# Patient Record
(demographics unavailable — no encounter records)

---

## 2024-10-09 NOTE — PHYSICAL EXAM
VA stable with prism. [TextEntry] : Physical Examination:  General: Not in acute distress Head: Normocephalic, no lesions Throat: Clear, no exudates or lesions Chest: Lungs clear, no rales, no rhonchi, no wheezes CVS: S1/S2, RR, no murmurs, no rubs Abdomen: Soft NT/ND, +BS Extremities: 2-3 + edema  Neurological: Alert, awake, no gross focal deficits

## 2024-10-09 NOTE — RESULTS/DATA
[TextEntry] : Labs: 10/3/24 133/5.6/ bun 51 cr 1.86 eGFR 27  07/03/24 131/4.6/98/22/31/1.03/gfr55/ca10.7 glucose 166  6/25/24 Renal Ultrasound The right kidney measures 10.0 cm x 4.4 cm x 3.5 cm. There is mild fullness of the right renal collecting system.  The left kidney measures 10.0 cm x 5.0 cm x 5.0 cm. There is a 1.1 x 0.8 x 1.2 cm midpole cyst. There is a 0.4 cm echogenic focus in the lower pole of the left kidney representing a nonobstructing stone.  6/5/24 137/4.6/103/20/29/0.98/gfr54/ca10.2

## 2024-10-09 NOTE — HISTORY OF PRESENT ILLNESS
I reviewed the H&P, I examined the patient, and there are no changes in the patient's condition.   [FreeTextEntry1] : 10/9/24 For follow up Has urinary incontinence and sometimes cannot make it to the bathroom Still has edema and she is reluctant to take higher dose Lasix  She takes only 20 mg every other day    History of Present Illness. 06/21/24 Chief Complaint: Initial evaluation:  Pt 81 year old female with pmh of HTN, OA, s/p right TKR, RA, Rinvoq  borderline DM, AFIB on Eliquis, Comes for intimal evaluation of SHRUTHI and hyperkalemia. She had right TKR on 12/16 2023 [facility] followed by rehab until January 2024 and she has been taking Celebrex 2x a day up until 3 weeks ago. She developed SHRUTHI and hyperkalemia while she was on [place] lactone, enalapril. her repeat labs shows improvement in her renal function and she also stopped taking Celebrex. she also complaining about urinary incontinence and difficult to control her urine with frequent urination.

## 2024-10-09 NOTE — PHYSICAL EXAM
[TextEntry] : Physical Examination:  General: Not in acute distress Head: Normocephalic, no lesions Throat: Clear, no exudates or lesions Chest: Lungs clear, no rales, no rhonchi, no wheezes CVS: S1/S2, RR, no murmurs, no rubs Abdomen: Soft NT/ND, +BS Extremities: 2-3 + edema  Neurological: Alert, awake, no gross focal deficits

## 2024-10-09 NOTE — HISTORY OF PRESENT ILLNESS
[FreeTextEntry1] : 10/9/24 For follow up Has urinary incontinence and sometimes cannot make it to the bathroom Still has edema and she is reluctant to take higher dose Lasix  She takes only 20 mg every other day    History of Present Illness. 06/21/24 Chief Complaint: Initial evaluation:  Pt 81 year old female with pmh of HTN, OA, s/p right TKR, RA, Rinvoq  borderline DM, AFIB on Eliquis, Comes for intimal evaluation of SHRUTHI and hyperkalemia. She had right TKR on 12/16 2023 [facility] followed by rehab until January 2024 and she has been taking Celebrex 2x a day up until 3 weeks ago. She developed SHRUTHI and hyperkalemia while she was on [place] lactone, enalapril. her repeat labs shows improvement in her renal function and she also stopped taking Celebrex. she also complaining about urinary incontinence and difficult to control her urine with frequent urination.

## 2024-10-09 NOTE — PLAN
[TextEntry] : Plan: She will hold spironolactone until our repeat lab work shows her hyperkalemia has resolved She will be given Lokelma 10 g 3 times a week and have repeat labs in 2 weeks If hyperkalemia resolves, then she can go back to spironolactone She was recommended to furosemide 20 mg daily Discussed with her cardiologist Thank you

## 2024-10-09 NOTE — ASSESSMENT
[FreeTextEntry1] : Assessment: CKD 4, renal function essentially unchanged She has significant lower extremity edema She has recurrent hyperkalemia partly due to CKD and use of spironolactone.  She was told to discontinue spironolactone however she is reluctant She was recommended to have furosemide 20 mg daily, however she is taking every other day or as needed

## 2024-10-09 NOTE — REVIEW OF SYSTEMS
[TextEntry] : Review of Systems: General: No weight loss, No recent fever, chills HEENT: no headache, no change in vision or hearing Pulmonary: No SOB, or cough  CVS: No chest pain, SOUSA, or change in exercise tolerance  Gastrointestinal: No Nausea/vomiting/constipation/diarrhea : Increases frequency and trouble controlling her urine  Skin: Denies no new rash, lesions, ulcer  Musco skeletal: Has chronic edema  Neurological: No headache, dizziness, vertigo

## 2024-10-11 NOTE — ASSESSMENT
[FreeTextEntry1] : ASSESSMENT: The patient comes in today with multiple chronic exacerbated complaints with a baseline history of rheumatoid arthritis.  She is having difficulty with ambulation using her walker.  With this in mind we have discussed options she does brace which is helpful.  We have discussed oral medications activity modification.  She does elect for injections.  She would like an aspiration of the mass we have discussed risk of recurrence as well We have had a thorough discussion regarding the patient's wrist and hand MRI with discussed the intra-articular as well as extra-articular pathologic findings including findings related to joint capsule, any swelling and or tendon pathology as well as ligamentous.  The patient verbalized complete understanding. We reviewed the images together.  Wrist not visualized clearly on this MRI   The patient was adequately and thoroughly informed of my assessment of their current condition(s).  - This may diminish bodily function for the extremity. We discussed prognosis, tx modalities including operative and nonoperative options for the above diagnostic assessment. As always, 2nd opinion is always provided as an option.  When accessible, I was able to review other physicians note(s) including reviewing other tests, imaging results as well as personally view these results for my own interpretation.   Injection:   The risks and benefits of a steroid injection were discussed in detail. The risks include but are not limited to: pain, infection, swelling, flare response, bleeding, subcutaneous fat atrophy, skin depigmentation and/or elevation of blood sugar. The risk of incomplete resolution of symptoms, recurrence and additional intervention was reviewed and considered by the patient. The patient agreed to proceed and under a sterile prep, I injected 1 unit 6mg into 1 cc of a combination of Celestone and Lidocaine into the right wrist joint. The patient tolerated the injection well.  Aspiration Consent [right ring finger base ganglion]  The risks and benefits of a [ganglion cyst aspiration] were discussed in detail.  The risks include but are not limited to: pain, infection, swelling, flare response, bleeding.  The risk of incomplete resolution of symptoms, recurrence and additional intervention was reviewed and considered by the patient.  The patient agreed to proceed and under a sterile prep, I aspirated s The patient tolerated the injection well.  The patient was adequately and thoroughly informed of my assessment of their current condition(s).  DISCUSSION: 1.  Injection and separate aspiration as above.  Continue bracing and activity modification. 2.  History of RA.  Follow-up 4 months 3.  MRI of right hand and partial wrist viewed and reviewed

## 2024-10-11 NOTE — HISTORY OF PRESENT ILLNESS
[FreeTextEntry1] : Patti is a very pleasant 81-year-old female presenting today with a chronic history of bilateral wrist and hand discomfort describes history of rheumatoid arthritis and difficulty with walking using her rolling walker.  Denies trauma.  Presents with right hand MRI

## 2024-10-11 NOTE — PHYSICAL EXAM
[de-identified] : Examination of the right wrist reveals a mild effusion tenderness upon compression.  Range of motion is grossly preserved.  No signs of infection. Examination of the left basal joint reveals discomfort and tenderness upon compression with positive grind test for pain. Examination of the right ring finger base reveals a radial sided mass likely ganglion. [de-identified] : [4] views of [bilateral hands and wrists] were obtained today in my office and were seen by me and discussed with the patient.  These [show findings consistent with bilateral basal joint OA and findings of IP joint OA] -right wrist atypical arthropathy

## 2024-10-16 NOTE — PHYSICAL EXAM
[de-identified] : Examination of the right wrist reveals a mild effusion tenderness upon compression.  Range of motion is grossly preserved.  No signs of infection. Examination of the left basal joint reveals discomfort and tenderness upon compression with positive grind test for pain.  [de-identified] : [4] views of [bilateral hands and wrists] were reviewed today in my office and were seen by me and discussed with the patient.  These [show findings consistent with bilateral basal joint OA and findings of IP joint OA] -right wrist atypical arthropathy

## 2024-10-16 NOTE — ASSESSMENT
[FreeTextEntry1] : ASSESSMENT: The patient comes in today with multiple chronic exacerbated complaints with a baseline history of rheumatoid arthritis. She is having difficulty with ambulation using her walker. We have had a thorough discussion regarding the patient's wrist and hand MRI with discussed the intra-articular as well as extra-articular pathologic findings including findings related to joint capsule, any swelling and or tendon pathology as well as ligamentous.  The patient verbalized complete understanding. We reviewed the images together.  At this point in time based on these findings more typical of arthritis we have discussed continued bracing activity modification and if need be injection.  She agrees with the above.   The patient was adequately and thoroughly informed of my assessment of their current condition(s).  - This may diminish bodily function for the extremity. We discussed prognosis, tx modalities including operative and nonoperative options for the above diagnostic assessment. As always, 2nd opinion is always provided as an option.  When accessible, I was able to review other physicians note(s) including reviewing other tests, imaging results as well as personally view these results for my own interpretation.  The patient was adequately and thoroughly informed of my assessment of their current condition(s).  DISCUSSION: 1.  Status post injection and separate aspiration as per prior note.  Continue bracing and activity modification. 2.  History of RA.  Follow-up 4 months 3.  MRI of right hand and wrist now fully visualized was thoroughly reviewed and reviewed with patient

## 2024-10-16 NOTE — HISTORY OF PRESENT ILLNESS
[FreeTextEntry1] : Patti is a very pleasant 81-year-old female presenting today with a chronic history of bilateral wrist and hand discomfort describes history of rheumatoid arthritis and difficulty with walking using her rolling walker.  Denies trauma.  Presents with right hand and wrist MRI

## 2024-10-23 NOTE — REASON FOR VISIT
[Follow-Up] : a follow-up visit [Home] : at home, [unfilled] , at the time of the visit. [Medical Office: ([facility])___] : at the medical office located in  [Verbal consent obtained from patient] : the patient, [unfilled]

## 2024-10-23 NOTE — REASON FOR VISIT
[Follow-Up] : a follow-up visit [Home] : at home, [unfilled] , at the time of the visit. [Medical Office: ([facility])___] : at the medical office located in  [Verbal consent obtained from patient] : the patient, [unfilled]

## 2024-10-25 NOTE — HISTORY OF PRESENT ILLNESS
[FreeTextEntry1] : 10/23/24 Telephonic follow-up visit for recent hyperkalemia, hyponatremia.  At that time her spironolactone was discontinued, and she was kept on furosemide.  Initial evaluation:  06/21/24 Pt 81-year-old female with pmh of HTN, OA, s/p right TKR, RA, Rinvoq, borderline DM, AFIB on Eliquis, Takotsubo cardiomyopathy, Comes for intimal evaluation of SHRUTHI and hyperkalemia. She had right TKR on 12/16 2023 [facility] followed by rehab until January 2024 and she has been taking Celebrex 2x a day up until 3 weeks ago. She developed SHRUTHI and hyperkalemia while she was on [place] lactone, enalapril. her repeat labs show improvement in her renal function, and she also stopped taking Celebrex. she also complaining about urinary incontinence and difficult to control her urine with frequent urination.     PSH: 2 ablations, loop recorder implant, cataracts.  Ex- smoker, Drinks socially.   PCP Dr Adria Valdez Masiolo- cardio   PMH venal insufficiency  DM HTN Rheumatoid arthritis  AFIB   PSH  right Knee replacement- Dec 2023  Loop recorder implant 2 ablasion cataracts   SH lives alone   2 children  worked as a

## 2024-10-25 NOTE — HISTORY OF PRESENT ILLNESS
[FreeTextEntry1] : 10/23/24 Telephonic follow-up visit for recent hyperkalemia, hyponatremia.  At that time her spironolactone was discontinued, and she was kept on furosemide.  Initial evaluation:  06/21/24 Pt 81-year-old female with pmh of HTN, OA, s/p right TKR, RA, Rinvoq, borderline DM, AFIB on Eliquis, Takotsubo cardiomyopathy, Comes for intimal evaluation of SHRUTHI and hyperkalemia. She had right TKR on 12/16 2023 [facility] followed by rehab until January 2024 and she has been taking Celebrex 2x a day up until 3 weeks ago. She developed SHRUTHI and hyperkalemia while she was on [place] lactone, enalapril. her repeat labs show improvement in her renal function, and she also stopped taking Celebrex. she also complaining about urinary incontinence and difficult to control her urine with frequent urination.     PSH: 2 ablations, loop recorder implant, cataracts.  Ex- smoker, Drinks socially.   PCP Dr Adria Valdez Masiolo- cardio   PMH venal insufficiency  DM HTN Rheumatoid arthritis  AFIB   PSH  right Knee replacement- Dec 2023  Loop recorder implant 2 ablasion cataracts   SH lives alone   2 children  worked as a

## 2024-10-25 NOTE — PLAN
[TextEntry] : Plan: She was advised to furosemide 20 mg 2 tablets daily May resume spironolactone 25 mg every other day until we have repeat labs in about a month if that is okay then she probably can take daily Will repeat BMP and telephonic visit in 1 month. She was advised to avoid high potassium diet and high salt diet. Time spent over 25 minutes   Thank you

## 2024-10-25 NOTE — RESULTS/DATA
[TextEntry] : Labs: October 21, 2024 Sodium 136, potassium 4.1, BUN 26, creatinine 0.98, EGFR 58  812/24 na 128, K 5.3 ca 10.3 cr 0.95  07/03/24 131/4.6/98/22/31/1.03/gfr55/ca10.7 glucose 166  6/25/24 Renal Ultrasound The right kidney measures 10.0 cm x 4.4 cm x 3.5 cm. There is mild fullness of the right renal collecting system. The left kidney measures 10.0 cm x 5.0 cm x 5.0 cm. There is a 1.1 x 0.8 x 1.2 cm midpole cyst. There is a 0.4 cm echogenic focus in the lower pole of the left kidney representing a Non obstructing stone.  6/5/24 137/4.6/103/20/29/0.98/gfr54/ca10.2

## 2024-10-25 NOTE — REVIEW OF SYSTEMS
[TextEntry] : Review of Systems: General: No weight loss, No recent fever, chills HEENT: no headache, no change in vision or hearing Pulmonary: No SOB, or cough  CVS: No chest pain, SOUSA, or change in exercise tolerance  Gastrointestinal: No Nausea/vomiting/constipation/diarrhea : No complaint of dysuria or urinary frequency, no excess foaming  Skin: Denies no new rash, lesions, ulcer  Musco skeletal: No edema  Neurological: No headache, dizziness, vertigo

## 2024-10-25 NOTE — ASSESSMENT
[FreeTextEntry1] : Assessment: 10/23/2024 Her hyperkalemia has resolved, hyponatremia has improved She continues to have lower extremity edema

## 2024-12-04 NOTE — CONSULT LETTER
[Dear  ___] : Dear  [unfilled], [Consult Letter:] : I had the pleasure of evaluating your patient, [unfilled]. [Please see my note below.] : Please see my note below. [Sincerely,] : Sincerely, [DrAby  ___] : Dr. SCHAEFFER [DrAby ___] : Dr. SCHAEFFER [FreeTextEntry3] : Am[facility]mer DO [facility]\par  Pulmonary Critical Care\par  Director Pulmonary Division\par  Medical Director Respiratory Therapy\par  [facility]\par  \par

## 2024-12-04 NOTE — DISCUSSION/SUMMARY
[FreeTextEntry1] : Stable lung nodules 11/24-11/23 no progressive abnormalities, on full AC for PAF Doing well, no acute pulmonary complaints  RA, on Rituxan, followed by Rheumatology Last spirometry with decreased flows from 2020, but remains  normal Lung exam and sp02 are normal, Allergy work up negative by hx, ANCA negative Aspiration precautions, per pt had sleep study in [facility], no Rx recommended by hx Saw nephrology Vaccinations reviewed 6  months or sooner if needed,

## 2024-12-04 NOTE — PHYSICAL EXAM
[No Acute Distress] : no acute distress [Normal Appearance] : normal appearance [Normal Rate/Rhythm] : normal rate/rhythm [Normal S1, S2] : normal s1, s2 [No Murmurs] : no murmurs [No Rubs] : no rubs [No Gallops] : no gallops [No Resp Distress] : no resp distress [Clear to Auscultation Bilaterally] : clear to auscultation bilaterally [Normal to Percussion] : normal to percussion [No Abnormalities] : no abnormalities [No Clubbing] : no clubbing [No Cyanosis] : no cyanosis [No Edema] : no edema [FROM] : FROM [Normal Color/ Pigmentation] : normal color/ pigmentation [No Focal Deficits] : no focal deficits [Oriented x3] : oriented x3 [Normal Affect] : normal affect [TextBox_99] : ambulates with walker

## 2024-12-04 NOTE — PROCEDURE
[FreeTextEntry1] : CT scan 11/24- stable lung nodules from 11/23 hyperdense material mid esophagus- saw GI  Spirometry is normal, decreased flows from 2020

## 2024-12-04 NOTE — QUALITY MEASURES
The ECG revealed a sinus rhythm. The ECG rate was 67 bpm.  [Patient unable to perform] : patient is unable to perform spirometry

## 2024-12-04 NOTE — HISTORY OF PRESENT ILLNESS
[Former] : former [< 20 pack-years] : < 20 pack-years [TextBox_4] : saw Dr Monteiro Rheumatology for  RA now off  Rinvoq, had Rituxan in September, repeat in March planned no fever, chill, chest pain no sputum no sig dyspnea no sinus complaints ambulates with walker, had TKR 12/23 remains on full AC, PAF, on Farxiga, off spironolactone Dr Blackwell saw nephrology  [YearQuit] : 1990

## 2024-12-06 NOTE — RESULTS/DATA
[TextEntry] : Labs: 12.5.24 141/4.7/101/27/38/1.08/gfr52/ca10.2  October 21, 2024 Sodium 136, potassium 4.1, BUN 26, creatinine 0.98, EGFR 58  812/24 na 128, K 5.3 ca 10.3 cr 0.95  07/03/24 131/4.6/98/22/31/1.03/gfr55/ca10.7 glucose 166  6/25/24 Renal Ultrasound The right kidney measures 10.0 cm x 4.4 cm x 3.5 cm. There is mild fullness of the right renal collecting system. The left kidney measures 10.0 cm x 5.0 cm x 5.0 cm. There is a 1.1 x 0.8 x 1.2 cm midpole cyst. There is a 0.4 cm echogenic focus in the lower pole of the left kidney representing a Non obstructing stone.  6/5/24 137/4.6/103/20/29/0.98/gfr54/ca10.2

## 2024-12-06 NOTE — REVIEW OF SYSTEMS
[TextEntry] : Review of Systems: General: No weight loss, No recent fever, chills HEENT: no headache, no change in vision or hearing Pulmonary: No SOB, or cough  CVS: No chest pain, SOUSA, or change in exercise tolerance  Gastrointestinal: No Nausea/vomiting/constipation/diarrhea : No complaint of dysuria or urinary frequency, no excess foaming  Skin: Denies no new rash, lesions, ulcer  Musco skeletal: Swelling of her legs Neurological: No headache, dizziness, vertigo

## 2024-12-06 NOTE — HISTORY OF PRESENT ILLNESS
[FreeTextEntry1] : 12/6/24 Telehealth Time spent: 25 minutes Reason for visit: Follow up for: CKD stage 3A recent Hyperkalemia Hyponatremia LE edema  No significant interval events   PMH CKD stage 3A venous insufficiency  DM HTN Rheumatoid arthritis  AFIB   PSH  right Knee replacement- Dec 2023  Loop recorder implant 2 ablasion cataracts   SH lives alone   2 children  worked as a        10/23/24 Telephonic follow-up visit for recent hyperkalemia, hyponatremia.  At that time her spironolactone was discontinued, and she was kept on furosemide.  Initial evaluation:  06/21/24 Pt 81-year-old female with pmh of HTN, OA, s/p right TKR, RA, Rinvoq, borderline DM, AFIB on Eliquis, Takotsubo cardiomyopathy, Comes for intimal evaluation of SHRUTHI and hyperkalemia. She had right TKR on 12/16 2023 [facility] followed by rehab until January 2024 and she has been taking Celebrex 2x a day up until 3 weeks ago. She developed SHRUTHI and hyperkalemia while she was on [place] lactone, enalapril. her repeat labs show improvement in her renal function, and she also stopped taking Celebrex. she also complaining about urinary incontinence and difficult to control her urine with frequent urination.     PSH: 2 ablations, loop recorder implant, cataracts.  Ex- smoker, Drinks socially.   PCP Dr Adria Parada- cardio

## 2024-12-06 NOTE — REASON FOR VISIT
[Home] : at home, [unfilled] , at the time of the visit. [Medical Office: ([facility])___] : at the medical office located in  [Verbal consent obtained from patient] : the patient, [unfilled] [Follow-Up] : a follow-up visit

## 2024-12-06 NOTE — ASSESSMENT
[FreeTextEntry1] : Assessments: CKD stage 3A: Renal function is essentially unchanged  Hyperkalemia- improved after stopping spironolactone and taking furosemide regularly Hyponatremia- resolved  LE peripheral edema- claims it has not changed

## 2024-12-06 NOTE — PLAN
[TextEntry] : Plan: CKD stage 3A: Enalapril 20 mg, farxiga 10 mg Hyperkalemia- discontinued spironolactone, furosemide 40 mg every other day, low potassium diet Hyponatremia: Avoid excess fluid intake and continue furosemide  HTN: Metoprolol 100 mg LE edema: Furosemide 40 mg every other day  Low salt diet  Follow up with recommended workups in 2 months - telehealth

## 2024-12-18 NOTE — DISCUSSION/SUMMARY
[Medication Risks Reviewed] : Medication risks reviewed [Surgical risks reviewed] : Surgical risks reviewed [de-identified] : This is a 81 year old F pt presents today for 1 year postop status post right total knee arthroplasty done on 12/19/2023. She is overall doing well. I reassured the pt that her implants are stable with no evidence of loosening or wear. Pt understands the importance of prophylaxis for invasive dental procedures. I sent a script for physical therapy.   Regarding the right hip: based on the x-ray, she has advanced right hip osteoarthritis. The nature of condition and treatment options were discussed in detail. Pt is a potential candidate for right FUNMI. I discussed conservative treatment such as cortisone injections, PT, anti-inflammatories, and low impact exercise. Currently, her symptom is minimal. She should continue to do low impact exercises. She may take Tylenol and NSAIDs as needed.  F/u in May after her trip.  The patient is a 81 year old individual with end stage arthritis of their right hip joint. The patient has exhausted a minimum of 3 months conservative treatment including prior injections, physical therapy, over the counter NSAIDS and pain medication where indicated. In addition, the patient's quality of life is diminished due to significant chronic pain. The patient is having difficulty with activities of daily living, including ambulating, descending stairs, and rising from a seated position. Based upon the patients continued symptoms and failure to respond to conservative treatment, I have recommended a right total hip replacement for this patient. A long discussion took place with the patient describing what a total joint replacement is and what the procedure would entail. A hip model, similar to the implant that will be used during the operation, was utilized to demonstrate and to discuss the various bearing surfaces of the implants. Implant fixation, press fit vs cemented, was also discussed with the patient. Bearing surfaces, including ceramic-on highly cross-linked polyethylene and metal on highly cross-linked polyethylene were discussed with the patient. Choices of implant manufacturers, mainly [employer] and [employer], were discussed and reviewed with preference to be made by patient and surgeon prior to operation. Final selection to be based on customary practice as well as preoperative templating with selection confirmed intraoperatively based on the patient's anatomy. The patient participated and agreed with the decision-making process. The hospitalization and post-operative care and rehabilitation were also discussed. The use of perioperative antibiotics and DVT prophylaxis were discussed. The risk, benefits and alternatives to surgical interventions were discussed at length with the patient. The patient was also advised of risks related to the medical comorbidities and elevated body mass index (BMI). A lengthy discussion took place to review the most common complications including but not limited to: deep vein thrombosis,pulmonary embolism, heart attack, stroke, infection, wound breakdown, numbness, damage to nerves, tendon, muscles, arteries or other blood vessels, death and other possible complications from anesthesia. The patient was told that we will take steps to minimize these risks by using sterile technique, antibiotics and DVT prophylaxis when appropriate and follow the patient postoperatively in the office setting to monitor progress. The possibility of recurrent pain, no improvement in pain and actual worsening of pain were also discussed with the patient.   The discharge plan of care focused on the patient going home following surgery. The patient was encouraged to make the necessary arrangements to have someone stay with them when they are discharged home. Following discharge, a home care nurse will visit the patient. The home care nurse will open your home care case and request home physical therapy services. Home physical therapy will commence following discharge provided it is appropriate and covered by the health insurance benefit plan.   The benefits of surgery were discussed with the patient including the potential for improving his/her current clinical condition through operative intervention. Alternatives to surgical intervention including continued conservative management were also discussed in detail. All questions were answered to the satisfaction of the patient. The treatment plan of care, as well as a model of a total hip equivalent to the one that will be used for their total joint replacement, was shared with the patient. The patient participated and agreed to the plan of care as well as the use of the recommended implants for their total hip replacement surgery.

## 2024-12-18 NOTE — END OF VISIT
[FreeTextEntry3] : I, Adelaida Gregory, acted solely as a scribe for Dr. Jose Chiu on this date 12/18/2024.  I, Jose Chiu MD, personally performed the services described in the documentation, reviewed the documentation recorded by the scribe in my presence and it accurately and completely records my words and actions.

## 2024-12-18 NOTE — HISTORY OF PRESENT ILLNESS
[0] : a current pain level of 0/10 [de-identified] : 12/18/24:  she is 1 year from right total knee arthroplasty. She denies any pain in the knee, but she feels some swelling and weakness of the right leg. She can climb 3 or 4 steps but she is unable to climb flight of stairs. She notes soreness in the medial aspect of her right knee. She wants to check her right hip as well, since she is going down to [place] for a while. She wants to make sure everything is okay .  She does not have a hip pain  9/16/2024: Patti is an 81-year-old female that presents today for follow-up evaluation of her right knee status post right total knee arthroplasty on 12/19/2023. She does have known advanced osteoarthritis of the right hip. With regard to her right knee, she denies any significant pain. No significant swelling. She does walk with the use of a walker due to poor balance. She denies any mechanical symptoms or instability of the right knee.  She does have a history of rheumatoid arthritis. She was recently started on a new infusion of Rituxan. She feels that her RIGHT hip pain is mildly improved after this infusion. She was previously indicated for right total hip arthroplasty but was forced to cancel the surgery secondary to severe anemia with a hemoglobin of 8.6.     She states the symptoms are improving. Pain levels include a current pain level of 5/10, a minimum pain level of 1/10 and a maximum pain level of 7/10.

## 2024-12-18 NOTE — PHYSICAL EXAM
[Walker] : ambulates with walker [Antalgic] : not antalgic [de-identified] : GENERAL APPEARANCE: Well nourished and hydrated, pleasant, alert, and oriented x 3. Appears their stated age. HEENT: Normocephalic, extraocular eye motion intact. Nasal septum midline. Oral cavity clear. External auditory canal clear. RESPIRATORY: Breath sounds clear and audible in all lobes. No wheezing, No accessory muscle use. CARDIOVASCULAR: No apparent abnormalities. No lower leg edema. No varicosities. Pedal pulses are palpable. NEUROLOGIC: Sensation is normal, no muscle weakness in the upper or lower extremities. DERMATOLOGIC: No apparent skin lesions, moist, warm, no rash. SPINE: Cervical spine appears normal and moves freely; thoracic spine appears normal and moves freely; lumbosacral spine appears normal and moves freely, normal, nontender. MUSCULOSKELETAL: Hands, wrists, and elbows are normal and move freely, shoulders are normal and move freely.  Right knee incision is healed she has range of motion of 3 to 115 degree without pain in the knee. No erythema in the knee   Musculoskeletal: antalgic and ambulates with walker . Patient is ambulating with a walker. [de-identified] : Right hip examination shows no pain with gentle logroll of the hip joint [de-identified] : 3V xray of the right knee done in office today and reviewed by Dr. Jose Chiu demonstrates s/p right knee implants in good positioning with no evidence of wear, loosening, or subsidence.  1V xray of the pelvis done in the office today and reviewed by Dr. Jose Chiu demonstrates advanced right hip osteoarthritis.

## 2025-01-03 NOTE — HISTORY OF PRESENT ILLNESS
[FreeTextEntry1] : Patti is a very pleasant 81-year-old female presenting today with a chronic history of bilateral wrist and hand discomfort. She describes history of rheumatoid arthritis and difficulty with walking using her rolling walker. Denies trauma. Presents with right hand and wrist MRI during prior visit. Pain is well controlled today.

## 2025-01-03 NOTE — PHYSICAL EXAM
[de-identified] : Examination of the right wrist with range of motion grossly preserved. There are no signs of infection. Mild effusion with tremendous improvement. Examination of the left basal joint reveals minimal discomfort and tenderness upon compression with positive grind test for pain. [de-identified] : [4] views of [bilateral hands and wrists] were reviewed today in my office and were seen by me and discussed with the patient. These [show findings consistent with bilateral basal joint OA and findings of IP joint OA] -right wrist atypical arthropathy.

## 2025-01-03 NOTE — PHYSICAL EXAM
[de-identified] : Examination of the right wrist with range of motion grossly preserved. There are no signs of infection. Mild effusion with tremendous improvement. Examination of the left basal joint reveals minimal discomfort and tenderness upon compression with positive grind test for pain. [de-identified] : [4] views of [bilateral hands and wrists] were reviewed today in my office and were seen by me and discussed with the patient. These [show findings consistent with bilateral basal joint OA and findings of IP joint OA] -right wrist atypical arthropathy.

## 2025-01-03 NOTE — ASSESSMENT
[FreeTextEntry1] : ASSESSMENT: The patient comes in today with a chronic history of bilateral wrist and hand discomfort. She describes history of rheumatoid arthritis and difficulty with walking using her rolling walker. Denies trauma. Presents with right hand and wrist MRI during prior visit which was thoroughly viewed and reviewed with the patient. Pain is well controlled today which we are both delighted with. At this stage she we will continue with activity modifications as well as bracing as needed.  She has done well with injections last visit.   The patient was adequately and thoroughly informed of my assessment of their current condition(s).  - This may diminish bodily function for the extremity.  We discussed prognosis, treatment modalities including operative and nonoperative options for the above diagnostic assessment. As always, 2nd opinion is always provided as an option. For this, when accessible, I was able to review other physicians note(s) including reviewing other tests, imaging results as well as personally view these results for my own interpretation.      The patient was adequately and thoroughly informed of my assessment of their current condition(s).   DISCUSSION: 1.  Continue activity modifications.  Bracing.  History of RA. 2.  MRI of right hand and wrist viewed and reviewed thoroughly with patient during prior visit. 3. [x]

## 2025-01-04 NOTE — PHYSICAL EXAM
[Alert] : alert [No Acute Distress] : no acute distress [EOMI] : extra ocular movement intact [No LAD] : no lymphadenopathy [No Thyroid Nodules] : no palpable thyroid nodules [Normal Rate and Effort] : normal respiratory rate and effort [Clear to Auscultation] : lungs were clear to auscultation bilaterally [Normal S1, S2] : normal S1 and S2 [Normal Rate] : heart rate was normal [Not Tender] : non-tender [Soft] : abdomen soft [Normal Reflexes] : deep tendon reflexes were 2+ and symmetric [No Tremors] : no tremors [Oriented x3] : oriented to person, place, and time [Normal Affect] : the affect was normal [Normal Insight/Judgement] : insight and judgment were intact [Normal Mood] : the mood was normal [de-identified] : walks w walker

## 2025-01-04 NOTE — REVIEW OF SYSTEMS
[Recent Weight Gain (___ Lbs)] : recent weight gain: [unfilled] lbs [Polyuria] : polyuria [Nocturia] : nocturia [Joint Stiffness] : joint stiffness [Dysphagia] : no dysphagia [Neck Pain] : no neck pain [Dysphonia] : no dysphonia [Chest Pain] : no chest pain [Shortness Of Breath] : no shortness of breath [Nausea] : no nausea [Constipation] : no constipation [Abdominal Pain] : no abdominal pain [Depression] : no depression [Anxiety] : no anxiety [Polydipsia] : no polydipsia [FreeTextEntry8] : on diuretic [FreeTextEntry9] : s/p knee replacement

## 2025-01-04 NOTE — ASSESSMENT
[FreeTextEntry1] : 81 year old female with:  1. Nontoxic multinodular goiter  benign FNA Right and Left nodules in past, nodules stable on sonogram 2024  - repeat thyroid sono summer 2025 to monitor size and appearance of thyroid nodules  2. normal TSH levels but fluctuation T3/T4 levels while on Amiodarone. 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 Thyroid levels are normal and stable - no thyroid meds at this time  3. Mild primary hyperparathyroidism w mildly elevated calcium in past and mildly elevated 24 hour urine calcium and Osteopenia at all sites on bone density (including forearm). Recent calcium levels normal. Vit D ok - Discussed that she has mild hyperparathyroidism and she does not meet criteria for parathyroid surgery at this time. Will hold off on localization study. - Calcium normal and vit D normal. Cont dietary calcium intake and cont OTC Vitamin D3 1000 units daily to avoid further PTH elevation, increased PO water and will cont to monitor labs. Was notified by Dr Sanchez office that calcium level too deniz and advised to stop OTC calcium supplement - will need to disuss with Dr Sanchez - cont Alendronate 70 mg weekly on Sunday, adherent with dosing and admin - repeat DXA summer 2025  4. IFG, A1c lower at 5.9 **down from 6.6%** - cont dietary changes, now on Farxiga by cardiology for cardiac issues  40 min spent w - reviewed plan multiple times, all questions answered to pt satisfaction

## 2025-01-04 NOTE — DATA REVIEWED
[FreeTextEntry1] : Thyroid FNA 5/3/12 Left LP nodule 1.2 cm - colloid nodule with cystic degeneration, no malignant cells  Thyroid sono in office 9/13/18 stable bilateral thyrod nodules, Left nodule previously biopsied and reported as benign  Thyroid sono 8/15/19 multiple thyroid nodules, Right nodule 1.9 cm  Thyroid uptake and scan 9/5/19 showed thyroiditis from Amiodarone.   Thyroid Right nodule FNA -nondiagnostic 9.2019 and repeat FNA 11/2019 benign, cat 2  Thyroid sonogram 9/3/20 several subcm thyroid nodules RLP nodule 8x7x10 mm - hypoechoic, decreased RLP nodule 5x4x4 mm - hypoechoic, decreased RLP nodule  5x4x3 mm - cystic, new LUP nodule 6x3x3 mm - hypoechoic, stable LMP nodule 8x4x4 mm - hypoechoic, stable LLP nodule [id number] mm - hypoechoic, stable LLP nodule 6x5x4 mm - hypoechoic, stable  Thyroid sono 6/2/21 RLP nodule 8x8x8 mm - hypoechoic, heterogeneos, solid, stable LUP nodule 5x3x5 mm - hypoechoic, solid, stable LMP nodule 6x3x6 mm - hypoechoic, stable/smaller LLP nodule 12x7x7 mm - hypoechoic, stable  Thyroid sono 7/5/22 RLP nodule [id number] mm - slight increase RMP nodule 4x3x3 mm  RLP nodule 5x6x3 mm RMP noudle 4x3x3 mm LLP nodule [id number] mm - stable LMP nodule 6x6x4 mm LUP nodule 6x4x4 mm LLP nodule 6x7x6 mm   Thyroid sonogram 7/14/23 RLP nodule [id number] mm - hypoechoic, stable, TR4 RMP nodule 5x3x4 mm - hypoechoic, stable, TR4 RLP nodule 5x3x4 mm - hypoechoic, stable, TR4 LLP nodule [id number] mm - hypoechoic, stable, TR4 LMP nodule 7x4x6 mm - hypoechoic, stable, TR4 LUP nodule 6x4x5 mm - hypoechoic, stable, TR4 LLP nodule 6x5x8 mm - hypoechoic, stable, TR4 no parathyroid lesions  Thyroid sono 6/2024 multiple nodules, that are stable, new RLP nodule 6 mm ============================================================== DXA 6/10/21 Osteopenia FRAX MOF 14.3%, hip fx 3.8% Tscores: L1-L4 -1.1, osteopenia Left FN -1.9, osteopenia Right FN -1.8, osteopenia Left total hip -1.1 osteopenia Right total hip -1.2 osteopenia Radius 33% -1.5 osteopenia  DXA 6/2023 worsening osteopenia L1-L4 -1.6 (-6% change) Left FN -2.2 (-4.9% change) Right FN -2.2 (-5.9% change) Left hip -1.5 (-5.1% change) Right hip -1.6 (-4.7% change) forearm -1.4 (+ 1.8% change)  =======================  Labs 11/30/22 Gluc 105, A1c 5.7 Ca 10.1, alb 3.9, Mg 2.2, Phos 3.8 GFR >60 vit D 29, iPTH 131 TSH 1.124

## 2025-01-04 NOTE — HISTORY OF PRESENT ILLNESS
[FreeTextEntry1] : Interval Hx  - now on Farxiga by cardiologist - was told to stop calcium supplement by heme/onc due to high calcium level  In [place], found to have elevated iPTH levels. Labs 4/2021 elevated iPTH levels, normal calcium.   OTC meds: stopped taking OTC citracal + D 1x daily per Gyn. taking Vit D 1000 IU daily.   Diet: minimal dairy intake about 2-4 ounces milk daily, eating more yogurt has osteopenia on DXA in 2019 and in 2021 - started on Alendronate 35 mg weekly in 2021 by Gyn and now on 70 mg weekly since 2023 =============================================================================================== Thyroid nodules Dx in 2012 when she felt lump in neck. FNA Left nodule 2012 was benign. 11/2019 Right thyroid nodule FNA benign NOtes: normal TSH levels but fluctuation T3/T4 levels while on Amiodarone. Has been off Amiodarone since July 2019, fluctuating TSH levels after Amiodarone discontinued. most recent TFTs normal current meds: no thyroid meds

## 2025-05-19 NOTE — DISCUSSION/SUMMARY
[Medication Risks Reviewed] : Medication risks reviewed [Surgical risks reviewed] : Surgical risks reviewed [de-identified] : This is a 82 year old F pt presents today for right hip and knee pain. She is status post right total knee arthroplasty on 12/19/2023. I reassured the pt that her implants are stable with no evidence of loosening or wear. She continues to walk with a walker. She should continue to do low impact exercises.  Right hip: Patient does have bone-on-bone right hip osteoarthritis based on today's x-ray. She is also symptomatic for right trochanteric bursitis. The nature of condition and treatment options were discussed in detail. Patient is a candidate for right FUNMI. I discussed conservative treatment such as cortisone injections, PT, anti-inflammatories, and low impact exercise. She has been doing physical therapy in [place]. Patient opted for a right hip bursa cortisone injection which she tolerated well. If the injection does not help, I will send her a script for US-guided IA cortisone injection. She should continue to do low impact exercises. F/u in 3 months for re-evaluation.  The patient is a 82 year old individual with end stage arthritis of their right hip joint. The patient has exhausted a minimum of 3 months conservative treatment including prior injections, physical therapy, over the counter NSAIDS and pain medication where indicated. In addition, the patient's quality of life is diminished due to significant chronic pain. The patient is having difficulty with activities of daily living, including ambulating, descending stairs, and rising from a seated position. Based upon the patients continued symptoms and failure to respond to conservative treatment, I have recommended a right total hip replacement for this patient. A long discussion took place with the patient describing what a total joint replacement is and what the procedure would entail. A hip model, similar to the implant that will be used during the operation, was utilized to demonstrate and to discuss the various bearing surfaces of the implants. Implant fixation, press fit vs cemented, was also discussed with the patient. Bearing surfaces, including ceramic-on highly cross-linked polyethylene and metal on highly cross-linked polyethylene were discussed with the patient. Choices of implant manufacturers, mainly [employer] and [employer], were discussed and reviewed with preference to be made by patient and surgeon prior to operation. Final selection to be based on customary practice as well as preoperative templating with selection confirmed intraoperatively based on the patient's anatomy. The patient participated and agreed with the decision-making process. The hospitalization and post-operative care and rehabilitation were also discussed. The use of perioperative antibiotics and DVT prophylaxis were discussed. The risk, benefits and alternatives to surgical interventions were discussed at length with the patient. The patient was also advised of risks related to the medical comorbidities and elevated body mass index (BMI). A lengthy discussion took place to review the most common complications including but not limited to: deep vein thrombosis,pulmonary embolism, heart attack, stroke, infection, wound breakdown, numbness, damage to nerves, tendon, muscles, arteries or other blood vessels, death and other possible complications from anesthesia. The patient was told that we will take steps to minimize these risks by using sterile technique, antibiotics and DVT prophylaxis when appropriate and follow the patient postoperatively in the office setting to monitor progress. The possibility of recurrent pain, no improvement in pain and actual worsening of pain were also discussed with the patient.   The discharge plan of care focused on the patient going home following surgery. The patient was encouraged to make the necessary arrangements to have someone stay with them when they are discharged home. Following discharge, a home care therapist will visit the patient. The home care therapist will open your home care case and request home physical therapy services. Home physical therapy will commence following discharge provided it is appropriate and covered by the health insurance benefit plan.   The benefits of surgery were discussed with the patient including the potential for improving his/her current clinical condition through operative intervention. Alternatives to surgical intervention including continued conservative management were also discussed in detail. All questions were answered to the satisfaction of the patient. The treatment plan of care, as well as a model of a total hip equivalent to the one that will be used for their total joint replacement, was shared with the patient. The patient participated and agreed to the plan of care as well as the use of the recommended implants for their total hip replacement surgery.

## 2025-05-19 NOTE — PHYSICAL EXAM
[de-identified] : GENERAL APPEARANCE: Well nourished and hydrated, pleasant, alert, and oriented x 3. Appears their stated age. HEENT: Normocephalic, extraocular eye motion intact. Nasal septum midline. Oral cavity clear. External auditory canal clear. RESPIRATORY: Breath sounds clear and audible in all lobes. No wheezing, No accessory muscle use. CARDIOVASCULAR: No apparent abnormalities. No lower leg edema. No varicosities. Pedal pulses are palpable. NEUROLOGIC: Sensation is normal, no muscle weakness in the upper or lower extremities. DERMATOLOGIC: No apparent skin lesions, moist, warm, no rash. SPINE: Cervical spine appears normal and moves freely; thoracic spine appears normal and moves freely; lumbosacral spine appears normal and moves freely, normal, nontender. MUSCULOSKELETAL: Hands, wrists, and elbows are normal and move freely, shoulders are normal and move freely.  Right knee incision is healed she has range of motion of 5 to 115 degree without pain in the knee. No erythema in the knee She has adequate right hip range of motion she has a tenderness and pain over trochanteric bursa Musculoskeletal: antalgic and ambulates with walker. Patient is ambulating with a walker.   Musculoskeletal: not antalgic and ambulates with walker . Right hip examination shows groin pain with gentle logroll of the hip joint.   [de-identified] :  3V xray of the right knee done in the office today and reviewed and demonstrates s/p implants in good positioning with no evidence of wear, loosening, or subsidence.   3 view right hip x-rays show bone-on-bone right hip osteoarthritis.

## 2025-05-19 NOTE — END OF VISIT
[FreeTextEntry3] : Documented by Adelaida Gregory acting solely as a scribe for Dr. Jose Chiu on this date 05/19/2025.   All Medical record entries made by the Scribe were at my, Dr. Jose Chiu's, direction and personally dictated by me on 05/19/2025. I have reviewed the chart and agree that the record accurately reflects my personal performance of the history, physical exam, assessment and plan. I have also personally directed, reviewed, and agreed with the discharge instructions.

## 2025-05-19 NOTE — REVIEW OF SYSTEMS
[Joint Pain] : joint pain [Joint Stiffness] : joint stiffness [Joint Swelling] : joint swelling [Negative] : Heme/Lymph [FreeTextEntry9] : right knee and hip pain

## 2025-05-19 NOTE — PROCEDURE
[de-identified] : Pt received right hip bursa injection with cortisone for hip bursitis   I discussed at length with the patient the planned steroid and lidocaine injection for hip bursitis. The risks, benefits, convalescence and alternatives were reviewed and pt consented. The possible side effects discussed included but were not limited to: pain, swelling, heat, bleeding, and redness. Symptoms are generally mild but if they are extensive then contact the office. Giving pain relievers by mouth such as NSAIDs or Tylenol can generally treat the reactions to steroid and lidocaine. Rare cases of infection have been noted. Rash, hives and itching may occur post injection. If you have muscle pain or cramps, flushing and or swelling of the face, rapid heart beat, nausea, dizziness, fever, chills, headache, difficulty breathing, swelling in the arms or legs, or have a prickly feeling of your skin, contact a health care provider immediately. Following this discussion, the hip was prepped with Alcohol and under sterile condition the 80 mg Depo-Medrol and 6 cc Lidocaine injection was performed with a spinal needle through a greater trochanter bursa injection site. The needle was introduced into the bursa  and the medication was injected. Upon withdrawal of the needle the site was cleaned with alcohol and a band aid applied. The patient tolerated the injection well and there were no adverse effects. Post injection instructions included no strenuous activity for 24 hours, cryotherapy and if there are any adverse effects to contact the office.

## 2025-05-19 NOTE — HISTORY OF PRESENT ILLNESS
[Pain Location] : pain [Stable] : stable [5] : a current pain level of 5/10 [Direct Pressure] : worsened by direct pressure [Hip Movement] : worsened by hip movement [Walking] : worsened by walking [Physical Therapy] : relieved by physical therapy [Rest] : relieved by rest [de-identified] : 05/19/25) This is a 82-year-old female who underwent right total knee arthroplasty on December 19, 2023. She continues have a medial right knee pain.  She also has occasional right groin pain and lateral hip pain. She is walking with walker. Patient had evaluation of her right hip shows advanced osteoarthritis.  She states her pain has improved with physical therapy in [place].  And therapist pushed on the lateral hip and she experienced severe pain.  And the therapist said to her she has a hip bursitis.  12/18/24: she is 1 year from right total knee arthroplasty. She denies any pain in the knee, but she feels some swelling and weakness of the right leg. She can climb 3 or 4 steps but she is unable to climb flight of stairs. She notes soreness in the medial aspect of her right knee. She wants to check her right hip as well, since she is going down to [place] for a while. She wants to make sure everything is okay. She does not have a hip pain  9/16/2024: Patti is an 81-year-old female that presents today for follow-up evaluation of her right knee status post right total knee arthroplasty on 12/19/2023. She does have known advanced osteoarthritis of the right hip. With regard to her right knee, she denies any significant pain. No significant swelling. She does walk with the use of a walker due to poor balance. She denies any mechanical symptoms or instability of the right knee.  She does have a history of rheumatoid arthritis. She was recently started on a new infusion of Rituxan. She feels that her RIGHT hip pain is mildly improved after this infusion. She was previously indicated for right total hip arthroplasty but was forced to cancel the surgery secondary to severe anemia with a hemoglobin of 8.6.    She states the symptoms are improving. Pain levels include a current pain level of 5/10, a minimum pain level of 1/10 and a maximum pain level of 7/10.     Pain levels include a current pain level of 0/10.

## 2025-05-20 NOTE — DISCUSSION/SUMMARY
[FreeTextEntry1] : Stable lung nodules 11/24-11/23 no progressive abnormalities, on full AC for PAF Doing well, no acute pulmonary complaints  RA, on Rituxan ( 3/25) , followed by Rheumatology, Xeljanz just added Last spirometry with decreased flows from 2020, but remains  normal Lung exam and sp02 are normal, Allergy work up negative by hx, ANCA negative Aspiration precautions, per pt had sleep study in [place], no Rx recommended by hx Saw nephrology Will repeat CT scan 11/25 6  months or sooner if needed,

## 2025-05-20 NOTE — HISTORY OF PRESENT ILLNESS
[Former] : former [< 20 pack-years] : < 20 pack-years [TextBox_4] :  Dr Monteiro [facility] for  RA Xeljanz added, had Rituxan in [place] no fever, chill, chest pain no sputum no sig dyspnea no sinus complaints ambulates with walker, had TKR 12/23 remains on full AC, PAF, on Farxiga, off spironolactone Dr Blackwell saw nephrology  [YearQuit] : 1990

## 2025-05-20 NOTE — CONSULT LETTER
[Dear  ___] : Dear  [unfilled], [Consult Letter:] : I had the pleasure of evaluating your patient, [unfilled]. [Please see my note below.] : Please see my note below. [Sincerely,] : Sincerely, [DrAby  ___] : Dr. SCHAEFFER [DrAby ___] : Dr. SCHAEFFER [FreeTextEntry3] : [facility]\par  Pulmonary Critical Care\par  Director Pulmonary Division\par  Medical Director Respiratory Therapy\par  [facility]\par  \par

## 2025-06-19 NOTE — ASSESSMENT
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  I recommended Mohs surgery due to the tumor type, location, and ill-defined nature of cancer.   Mohs Appropriate Use Criteria (AUC) Score: 7  I explained that following extirpation there will be a full thickness defect of the involved area. The reconstructive options will be based on the defect size and surrounding tissue laxity of the involved area. Primary closure is only possible for smaller defects. For larger defects, local tissue rearrangement or skin grafting may be necessary. Risks following layered primary closure or local tissue rearrangement include wound dehiscence, contour irregularity, bleeding, infection, and paresthesia (nerve damage including sensory deficit or motor weakness). Risks following skin grafting include wound dehiscence, skin graft nonadherence (partial or complete), contour irregularity, bleeding, infection, paresthesia, and donor site complications. I explained that the patient will need to abstain from physical activity for 1-2 weeks following the surgery, that they would heal with a scar, and also discussed the chances of infection, bleeding, potential sensory or motor nerve damage, and recurrence.  The patient indicated that s/he understood the risks and wished to schedule the procedure. -- In particular, for reconstruction we discussed linear closure  Thank you for this Mohs surgery referral. We recommended that Ms. UMM DURAND follow up with Her referring dermatologist for routine skin exams following surgery.   Anila Tay MD Physician, Dermatology & Dermatologic Surgery [facility]    8

## 2025-06-19 NOTE — PHYSICAL EXAM
[Alert] : alert [Oriented x 3] : ~L oriented x 3 [Well Nourished] : well nourished [Conjunctiva Non-injected] : conjunctiva non-injected [No Visual Lymphadenopathy] : no visual  lymphadenopathy [No Clubbing] : no clubbing [No Edema] : no edema [No Bromhidrosis] : no bromhidrosis [No Chromhidrosis] : no chromhidrosis [Hair] : Hair [Scalp] : Scalp [Face] : Face [Nose] : Nose [Eyelids] : Eyelids [Ears] : Ears [Neck] : Neck [Nodes] : Nodes [FreeTextEntry3] : -right cheek with 0.8 cm x 0.8 cm pink biopsy scar (location verified by sending photo from our office to referring dermatologist as no phot sent from the biopsy. Location of biopsy confirmed by [facility] on 6/17/25 -no cervical adenopathy

## 2025-06-19 NOTE — HISTORY OF PRESENT ILLNESS
[FreeTextEntry1] : SCC in Situ Right Cheek [de-identified] : Mohs Surgery Consultation  06/17/2025   Referred by: Dr. Rossi/[facility]  We had the pleasure of seeing your patient in consultation for Mohs Micrographic Surgery.  Ms. UMM DURAND is a 82 year old F who presents for evaluation of a recently biopsied SCC in situ of the right cheek, biopsy done in [place] at [facility] on 4/4 25 (outside path scanned into EMR). Notted the lesion as a rough spot that bled prior to biopsy. Hx of numerous NMSC and Mohs in the past.  Pertinent positives noted below  History of HIV or hepatitis: No Blood thinners: Eliquis Antibiotic Prophylaxis: None  Medical implants: None  Social History: no tobacco or alcohol   The patient's review of systems questionnaire was reviewed. Education needs were identified. There were no barriers to learning.

## 2025-06-21 NOTE — END OF VISIT
[] : Fellow [FreeTextEntry3] :  I personally saw and examined this patient with the fellow physician and was present for the key portions of history taking, examination as well as the Mohs and repair procedures performed .  I agree with the assessment and plan as documented in the fellow's note unless noted below.

## 2025-06-21 NOTE — ASSESSMENT
[FreeTextEntry1] : Mohs surgery for SCC Right cheek -- 2 stage(s) of Mohs surgery performed 06/20/2025 -- Primary intermediate linear layered repair performed -- f/u for wound check PRN -- Mupirocin oint bid -- f/u for routine skin exams as previously recommended by Her referring dermatologist.   Thank you for this Mohs surgery referral.  Anila Tay MD Physician, Dermatology & Dermatologic Surgery [facility].

## 2025-06-21 NOTE — PROCEDURE
[TextEntry] : Mohs Surgery Procedure Note   A surgical time out was taken to confirm the patient's correct identity and the correct site. The operative site was identified by the patient and surgical team prior to surgery and the patient agreed to proceed with the surgical site which was marked prior to anesthesia infiltration.   Mohs Micrographic Surgery Report Date Collected: 06/20/2025 Date Received: Same as above Date Verified: Same as above Attending Surgeon: Anila Tay MD Fellow: Jennifer Love MD Assistants: Otto Duron RN, Carmita Pruitt MA,  Kristen Groves MA Procedure#:  Diagnosis: SCC, invasive Location: Right cheek Pre-op Size: 1.0 cm x 0.9 cm Post-Operative Final Defect Size: 2.3 cm x1.5 cm Stages (number of pieces per stage): 2 (2/1, 1/2) Pathology, if tumor noted in stages: Debulk with atypical keratinocytic proliferation c/w invasive SCC. Stage I with residual atypical keratinocytic proliferation c/w residual SCC in situ into the reticular dermis of piece 2. Stage II with Sparse perivascular lymphocytic infiltrate without evidence of residual carcinoma on sections examined Indications for procedure: Ill-defined tumor margins, high-priority anatomic location for preservation of normal tissue, aggressive histologic nature of the tumor Repair type: Primary intermediate linear layered Subcutaneous and dermal sutures used: 4-0 Vicryl.     Epidermal sutures:5-0 fast Total volume of anesthesia: 10 mL Repair length: 4.2 cm   Mohs Procedure Report:   The patient was escorted to the outpatient surgical operatory. The proposed Mohs procedure and reconstruction options were discussed with the patient. The risks, benefits, and alternatives were discussed and the patient signed a written consent form. A time out was taken to confirm the patient's identity and the exact location of the skin cancer. After the patient was placed in a recumbent position, the surgical site was cleaned with alcohol and either Betadine (for eyes and ears) or chlorhexidine gluconate, draped, and infiltrated with 0.5% lidocaine with 1:200,000 epinephrine local anesthetic. A sterile surgical marking pen was used to outline a thin margin of normal-appearing skin around the tumor. A beveled incision was then made using a scalpel. Small orienting nicks were made on the specimen and the surrounding skin. The tissue was then sharply dissected from the surrounding skin. Hemostasis was maintained with the electrosurgical unit and/or pressure. A temporary dressing was placed on the surgical defect until the frozen section slides were interpreted. The oriented specimen was placed in a Royal dish and transported to the Mohs laboratory. For each stage of the procedure, a visual representation of the specimen was drawn on a Mohs map. This map graphically depicts the specimen's two-dimensional appearance, orientation, and tissue preparation, which consists of dividing the specimen and applying tissue dyes. Because the deep and peripheral portions of the tissue are then embedded in the same geometric plane, the map also represents an oriented scale drawing of the resulting histologic sections. The Mohs technician then prepared frozen section slides using standard techniques. The slides were stained with hematoxylin and eosin, and cover slips were applied. The frozen section slides were then examined under the microscope. If tumor was found, it was localized on the map. The orienting nicks on the original specimen corresponded to similar nicks on the surgical defect so areas of identified tumor could be mapped back to the patient and resected. Additional layers of removed skin were then processed as indicated above. This iterative process continued as applicable until no tumor was observed microscopically. At this stage, the Mohs resection was complete.  RECONSTRUCTION PROCEDURE NOTE INTERMEDIATE LINEAR LAYERED CLOSURE   Prior to beginning the procedure, patient identity was verified, as well as the procedure to be performed and the site.  All equipment required was ready and available. The patient was positioned appropriately.   INDICATIONS:  Various reconstructive modalities were discussed with the patient, and it was decided that an intermediate linear layered closure would best preserve normal anatomical and functional relationships. After a discussion of the risks including but not limited to bleeding, scarring, infection, nerve damage, unsatisfactory results, and wound dehiscense, informed consent was obtained, and the patient underwent the procedure as follows.   PROCEDURE:  The patient was taken to the operative suite and placed supine on the operating room table. The area was anesthetized with 1% Lidocaine with 1/100,000 epinephrine. The area was washed with Chlorhexidine or Betadine, rinsed with sterile saline, and draped with sterile towels. The wound edges were debeveled, and the wound was undermined widely in all directions if needed. Hemostasis was obtained with spot electrodessication if needed. Deep dermal and subcutaneous closure was performed using the indicated buried sutures.  Using a 15 blade scalpel, redundant cones were removed as Burow's triangles to align with the relaxed skin tension lines in a curvilinear fashion if needed. The epidermis was closed and approximated using the indicated sutures. The final wound length is noted above. A sterile pressure dressing was applied, and wound care instructions were given.   FINAL PROCEDURE: Intermediate linear layered closure   COMPLICATIONS: None

## 2025-06-21 NOTE — PHYSICAL EXAM
[Alert] : alert [Oriented x 3] : ~L oriented x 3 [Well Nourished] : well nourished [Conjunctiva Non-injected] : conjunctiva non-injected [No Visual Lymphadenopathy] : no visual  lymphadenopathy [No Clubbing] : no clubbing [No Edema] : no edema [No Bromhidrosis] : no bromhidrosis [No Chromhidrosis] : no chromhidrosis [Hair] : Hair [Scalp] : Scalp [Face] : Face [Nose] : Nose [Eyelids] : Eyelids [Ears] : Ears [Neck] : Neck [Nodes] : Nodes [FreeTextEntry3] : -right cheek with 1.0 cm x 0.9 cm pink biopsy scar  -no cervical adenopathy

## 2025-06-21 NOTE — ASSESSMENT
[FreeTextEntry1] : Mohs surgery for SCC Right cheek -- 2 stage(s) of Mohs surgery performed 06/20/2025 -- Primary intermediate linear layered repair performed -- f/u for wound check PRN -- Mupirocin oint bid -- f/u for routine skin exams as previously recommended by Her referring dermatologist.   Thank you for this Mohs surgery referral.  Anila Tay MD Physician, Dermatology & Dermatologic Surgery [facility].

## 2025-06-21 NOTE — HISTORY OF PRESENT ILLNESS
[FreeTextEntry1] : SCC Right Cheek [de-identified] : 06/20/2025  Referred by: Dr. Rossi/[facility]  We had the pleasure of seeing your patient for Mohs Micrographic Surgery.  Ms. 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 Notted the lesion as a rough spot that bled prior to biopsy. Hx of numerous NMSC and Mohs in the past. Consultation was done 6/17/2025 and site confirmed with Dr. Lo's office in [place]  Pertinent positives noted below  History of HIV or hepatitis: No Blood thinners: Eliquis Antibiotic Prophylaxis: None  Medical implants: None  Social History: no tobacco or alcohol   The patient's review of systems questionnaire was reviewed. Education needs were identified. There were no barriers to learning.  Mohs surgery consultation for SCC in Situ Right Cheek  -- I explained the treatment options of topical immunomodulatory or chemotherapy, electrodessication and curettage, radiation therapy, excision and Mohs surgery.  I recommended Mohs surgery due to the tumor type, location, and ill-defined nature of cancer.   Mohs Appropriate Use Criteria (AUC) Score: 7  I explained that following extirpation there will be a full thickness defect of the involved area. The reconstructive options will be based on the defect size and surrounding tissue laxity of the involved area. Primary closure is only possible for smaller defects. For larger defects, local tissue rearrangement or skin grafting may be necessary. Risks following layered primary closure or local tissue rearrangement include wound dehiscence, contour irregularity, bleeding, infection, and paresthesia (nerve damage including sensory deficit or motor weakness). Risks following skin grafting include wound dehiscence, skin graft nonadherence (partial or complete), contour irregularity, bleeding, infection, paresthesia, and donor site complications. I explained that the patient will need to abstain from physical activity for 1-2 weeks following the surgery, that they would heal with a scar, and also discussed the chances of infection, bleeding, potential sensory or motor nerve damage, and recurrence.  The patient indicated that s/he understood the risks and wished to proceed -- In particular, for reconstruction we discussed linear closure  Thank you for this Mohs surgery referral. We recommended that Ms. UMM DURAND follow up with Her referring dermatologist for routine skin exams following surgery.

## 2025-06-21 NOTE — HISTORY OF PRESENT ILLNESS
[FreeTextEntry1] : SCC Right Cheek [de-identified] : 06/20/2025  Referred by: Dr. Rossi/[facility]  We had the pleasure of seeing your patient for Mohs Micrographic Surgery.  Ms. 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 Notted the lesion as a rough spot that bled prior to biopsy. Hx of numerous NMSC and Mohs in the past. Consultation was done 6/17/2025 and site confirmed with Dr. Lo's office in [place]  Pertinent positives noted below  History of HIV or hepatitis: No Blood thinners: Eliquis Antibiotic Prophylaxis: None  Medical implants: None  Social History: no tobacco or alcohol   The patient's review of systems questionnaire was reviewed. Education needs were identified. There were no barriers to learning.  Mohs surgery consultation for SCC in Situ Right Cheek  -- I explained the treatment options of topical immunomodulatory or chemotherapy, electrodessication and curettage, radiation therapy, excision and Mohs surgery.  I recommended Mohs surgery due to the tumor type, location, and ill-defined nature of cancer.   Mohs Appropriate Use Criteria (AUC) Score: 7  I explained that following extirpation there will be a full thickness defect of the involved area. The reconstructive options will be based on the defect size and surrounding tissue laxity of the involved area. Primary closure is only possible for smaller defects. For larger defects, local tissue rearrangement or skin grafting may be necessary. Risks following layered primary closure or local tissue rearrangement include wound dehiscence, contour irregularity, bleeding, infection, and paresthesia (nerve damage including sensory deficit or motor weakness). Risks following skin grafting include wound dehiscence, skin graft nonadherence (partial or complete), contour irregularity, bleeding, infection, paresthesia, and donor site complications. I explained that the patient will need to abstain from physical activity for 1-2 weeks following the surgery, that they would heal with a scar, and also discussed the chances of infection, bleeding, potential sensory or motor nerve damage, and recurrence.  The patient indicated that s/he understood the risks and wished to proceed -- In particular, for reconstruction we discussed linear closure  Thank you for this Mohs surgery referral. We recommended that Ms. UMM DURAND follow up with Her referring dermatologist for routine skin exams following surgery.

## 2025-06-26 NOTE — HISTORY OF PRESENT ILLNESS
[FreeTextEntry1] : 82F with pmhx  of HTN, thyroiditis, stress-related cardiomyopathy and persistent atrial fibrillation/ flutter with RVR s/p AF ablation x 2 (12/5/2016 and 7/11/2016) s/p ILR presenting for follow up.   She initially maintained SR following repeat ablation, but began having brief episodes symptomatic PAT after discontinuation of Amiodarone which was restarted. Did very well on low dose Amiodarone 100mg for >1 yr. April 2019, started have brief episodes of PAF 2-40min duration despite increasing Amiodarone to 200mg daily. Overall AF burden remained minimal at < 1%. History of thyroid nodules and TFTs have been closely monitored by Endocrine. Amiodarone was discontinued (7/2019) to avoid long term toxicities. Shortly after discontinuation, found to be hyperthyroid, diagnosed with thyroiditis and enlarged thyroid nodules. Subsequent thyroid biopsy was benign.   Her ILR was implanted 11/16/2020- no recent events have been noted.   Despite the relatively infrequent nature of her arrhythmia, past symptoms have been highly bothersome and trigger significant anxiety. We have continued to discuss options of alternative AAT, repeat ablation and PPM/AVJ ablation. During more recent f/u visits she reported significant symptomatic improvement with very low AT/AF burden <1%. Continued eliquis and metoprolol recommended.   Today she is doing well overall and denies palpitation, dizziness, SOB, syncope or near syncope.  Tolerating AC without significant bleeding concerns, bruising or falls. ILR reveals few episodes of AF this year, overall rate controlled, totaling a few hours in duration. These have not been associated with sig symptoms.  ECG today reveals SR 78 bpm

## 2025-06-26 NOTE — DISCUSSION/SUMMARY
[FreeTextEntry1] : 82 year old woman with history of HTN, thyroiditis, stress-related cardiomyopathy and persistent atrial fibrillation/ flutter with RVR s/p AF ablation x 2 (12/5/2016 and 7/11/2016) s/p ILR presenting for follow up. Low AF burden. Tolerating AC without bleeding concerns or falls.   Recommendations: - continue metoprolol xl 100mg po qd - continue eliquis 5mg po bid for stroke ppx (dose is ok)  - f/u with general cardiology routinely  - remote monitoring in place - EP follow up one year or sooner or prn  [EKG obtained to assist in diagnosis and management of assessed problem(s)] : EKG obtained to assist in diagnosis and management of assessed problem(s)

## 2025-06-26 NOTE — REVIEW OF SYSTEMS
[Fever] : no fever [Chills] : no chills [Feeling Fatigued] : not feeling fatigued [SOB] : no shortness of breath [Dyspnea on exertion] : not dyspnea during exertion [Chest Discomfort] : no chest discomfort [Lower Ext Edema] : no extremity edema [Leg Claudication] : no intermittent leg claudication [Palpitations] : no palpitations [Orthopnea] : no orthopnea [Syncope] : no syncope [Dizziness] : no dizziness [Easy Bleeding] : no tendency for easy bleeding [Easy Bruising] : no tendency for easy bruising [FreeTextEntry5] : see hpi

## 2025-07-14 NOTE — RESULTS/DATA
[TextEntry] : Labs: 06/09/25 142/4.6/107/22/31/0.84/gfr69/ca10.2  Vit D 34   12.5.24 141/4.7/101/27/38/1.08/gfr52/ca10.2  October 21, 2024 Sodium 136, potassium 4.1, BUN 26, creatinine 0.98, EGFR 58  812/24 na 128, K 5.3 ca 10.3 cr 0.95  07/03/24 131/4.6/98/22/31/1.03/gfr55/ca10.7 glucose 166  6/25/24 Renal Ultrasound The right kidney measures 10.0 cm x 4.4 cm x 3.5 cm. There is mild fullness of the right renal collecting system. The left kidney measures 10.0 cm x 5.0 cm x 5.0 cm. There is a 1.1 x 0.8 x 1.2 cm midpole cyst. There is a 0.4 cm echogenic focus in the lower pole of the left kidney representing a Non obstructing stone.  6/5/24 137/4.6/103/20/29/0.98/gfr54/ca10.2

## 2025-07-14 NOTE — PLAN
[TextEntry] : Plan: CKD stage 3A: Continue Enalapril 20 mg, Farxiga 10 mg HTN: Continue furosemide 40 mg every other day, Metoprolol 100 mg Hyponatremia: Avoid excess fluid intake  Low salt diet  Follow up with recommended workups in 6 months

## 2025-07-14 NOTE — PHYSICAL EXAM
[TextEntry] : Physical Examination: General: Not in acute distress Head: Normocephalic, no lesions Eyes:EOMI, Fundi normal Throat: Clear, no exudates or lesions Chest: Lungs clear, no rales, no rhonchi, no wheezes CVS: S1/S2, RR, no murmurs, no rubs Abdomen: Soft NT/ND, +BS Extremities: No edema, ulcers, or cyanosis Neurological: Alert, awake, no gross focal deficits

## 2025-07-14 NOTE — ASSESSMENT
[FreeTextEntry1] : Assessments: CKD stage 3A: Renal function is essentially unchanged  HTN: controlled  Hyperkalemia, resolved LE peripheral edema improved

## 2025-07-14 NOTE — HISTORY OF PRESENT ILLNESS
[FreeTextEntry1] : 07/14/25 Reason for visit: Follow up for: CKD stage 3A recent Hyperkalemia Hyponatremia She had mole removal in June 2025  She is going for hip replacement July 2025 at [facility]  She had knee replacement     12/6/24 Telehealth Time spent: 25 minutes Reason for visit: Follow up for: CKD stage 3A recent Hyperkalemia Hyponatremia LE edema  No significant interval events   PMH CKD stage 3A venous insufficiency  DM HTN Rheumatoid arthritis  AFIB   PSH  right Knee replacement- Dec 2023  Loop recorder implant 2 ablasion cataracts   SH lives alone   2 children  worked as a        10/23/24 Telephonic follow-up visit for recent hyperkalemia, hyponatremia.  At that time her spironolactone was discontinued, and she was kept on furosemide.  Initial evaluation:  06/21/24 pmh of HTN, OA, s/p right TKR, RA, Rinvoq, borderline DM, AFIB on Eliquis, Takotsubo cardiomyopathy, Comes for intimal evaluation of SHRUTHI and hyperkalemia. She had right TKR on 12/16 2023 [facility] followed by rehab until January 2024 and she has been taking Celebrex 2x a day up until 3 weeks ago. She developed SHRUTHI and hyperkalemia while she was on [place] lactone, enalapril. her repeat labs show improvement in her renal function, and she also stopped taking Celebrex. she also complaining about urinary incontinence and difficult to control her urine with frequent urination.     PSH: 2 ablations, loop recorder implant, cataracts.  Ex- smoker, Drinks socially.   PCP Dr Adria Parada- cardio  
fair balance

## 2025-07-16 NOTE — PHYSICAL EXAM
[Alert] : alert [No Acute Distress] : no acute distress [EOMI] : extra ocular movement intact [No LAD] : no lymphadenopathy [No Thyroid Nodules] : no palpable thyroid nodules [Normal Rate and Effort] : normal respiratory rate and effort [Clear to Auscultation] : lungs were clear to auscultation bilaterally [Normal S1, S2] : normal S1 and S2 [Normal Rate] : heart rate was normal [Not Tender] : non-tender [Soft] : abdomen soft [Normal Reflexes] : deep tendon reflexes were 2+ and symmetric [No Tremors] : no tremors [Oriented x3] : oriented to person, place, and time [Normal Affect] : the affect was normal [Normal Insight/Judgement] : insight and judgment were intact [Normal Mood] : the mood was normal [de-identified] : walks w walker

## 2025-07-16 NOTE — HISTORY OF PRESENT ILLNESS
[FreeTextEntry1] : Interval Hx  - to have right hip replacement next week - not taking calcium supplement  - worsening diet - eating more candy  In [place], found to have elevated iPTH levels. Labs 4/2021 elevated iPTH levels, normal calcium.   OTC meds: stopped taking OTC citracal. taking Vit D 1000 IU daily.   Diet: minimal dairy intake about 2-4 ounces milk daily, eating more yogurt has osteopenia on DXA in 2019 and in 2021 - started on Alendronate 35 mg weekly in 2021 by Gyn and now on 70 mg weekly since 2023 =============================================================================================== Thyroid nodules Dx in 2012 when she felt lump in neck. FNA Left nodule 2012 was benign. 11/2019 Right thyroid nodule FNA benign NOtes: normal TSH levels but fluctuation T3/T4 levels while on Amiodarone. Has been off Amiodarone since July 2019, fluctuating TSH levels after Amiodarone discontinued. most recent TFTs normal current meds: no thyroid meds

## 2025-07-16 NOTE — ASSESSMENT
[FreeTextEntry1] : 82 year old female with:  1. Nontoxic multinodular goiter  benign FNA Right and Left nodules in past, nodules stable on sonogram 2025 - slight changes in size likely due to measurement technique  - repeat thyroid sono summer 2026 to monitor size and appearance of thyroid nodules  2. normal TSH levels but fluctuation T3/T4 levels while on Amiodarone. 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 Thyroid levels are normal and stable - no thyroid meds at this time  3. Mild primary hyperparathyroidism w mildly elevated calcium in past and mildly elevated 24 hour urine calcium in 2021 and Osteopenia at all sites on bone density (including forearm). Recent calcium levels normal. Vit D ok - Discussed that she has mild hyperparathyroidism and she does not meet criteria for parathyroid surgery at this time. Will hold off on localization study. - Calcium normal and vit D normal. Increase dietary calcium intake (pt getting conflicting information from other doc about calcium supplementation) and cont OTC Vitamin D3 1000 units daily to avoid further PTH elevation, increased PO water and will cont to monitor labs.  - cont Alendronate 70 mg weekly on Sunday, adherent with dosing and admin - repeat DXA summer 2025, done yesterday - results pending  4. IFG, A1c stable at 5.9 **down from 6.6%** - cont dietary changes, now on Farxiga by cardiology for cardiac issues  40 min spent w - reviewed plan multiple times, all questions answered to pt satisfaction

## 2025-07-16 NOTE — DATA REVIEWED
[FreeTextEntry1] : Thyroid FNA 5/3/12 Left LP nodule 1.2 cm - colloid nodule with cystic degeneration, no malignant cells  Thyroid sono in office 9/13/18 stable bilateral thyrod nodules, Left nodule previously biopsied and reported as benign  Thyroid sono 8/15/19 multiple thyroid nodules, Right nodule 1.9 cm  Thyroid uptake and scan 9/5/19 showed thyroiditis from Amiodarone.   Thyroid Right nodule FNA -nondiagnostic 9.2019 and repeat FNA 11/2019 benign, cat 2  Thyroid sonogram 9/3/20 several subcm thyroid nodules RLP nodule 8x7x10 mm - hypoechoic, decreased RLP nodule 5x4x4 mm - hypoechoic, decreased RLP nodule  5x4x3 mm - cystic, new LUP nodule 6x3x3 mm - hypoechoic, stable LMP nodule 8x4x4 mm - hypoechoic, stable LLP nodule [id number] mm - hypoechoic, stable LLP nodule 6x5x4 mm - hypoechoic, stable  Thyroid sono 6/2/21 RLP nodule 8x8x8 mm - hypoechoic, heterogeneos, solid, stable LUP nodule 5x3x5 mm - hypoechoic, solid, stable LMP nodule 6x3x6 mm - hypoechoic, stable/smaller LLP nodule 12x7x7 mm - hypoechoic, stable  Thyroid sono 7/5/22 RLP nodule [id number] mm - slight increase RMP nodule 4x3x3 mm  RLP nodule 5x6x3 mm RMP noudle 4x3x3 mm LLP nodule [id number] mm - stable LMP nodule 6x6x4 mm LUP nodule 6x4x4 mm LLP nodule 6x7x6 mm   Thyroid sonogram 7/14/23 RLP nodule [id number] mm - hypoechoic, stable, TR4 RMP nodule 5x3x4 mm - hypoechoic, stable, TR4 RLP nodule 5x3x4 mm - hypoechoic, stable, TR4 LLP nodule [id number] mm - hypoechoic, stable, TR4 LMP nodule 7x4x6 mm - hypoechoic, stable, TR4 LUP nodule 6x4x5 mm - hypoechoic, stable, TR4 LLP nodule 6x5x8 mm - hypoechoic, stable, TR4 no parathyroid lesions  Thyroid sono 6/2024 multiple nodules, that are stable, new RLP nodule 6 mm  Thyroid sono 7/7/25 RMP nodule 5x4x5 mm stable TR4 RMP nodule 5x3x5 mm TR3 stable RLP nodule [id number] mm slight increase TR4 LUP nodule 7x5x5 mm stable TR4 LMP nodule 8x5x7 mm slight increase TR3 LLP nodule 7x4x6 mm stable TR4 LLP nodule 10x7x8 mm decreased TR4 ============================================================== DXA 6/10/21 Osteopenia FRAX MOF 14.3%, hip fx 3.8% Tscores: L1-L4 -1.1, osteopenia Left FN -1.9, osteopenia Right FN -1.8, osteopenia Left total hip -1.1 osteopenia Right total hip -1.2 osteopenia Radius 33% -1.5 osteopenia  DXA 6/2023 worsening osteopenia L1-L4 -1.6 (-6% change) Left FN -2.2 (-4.9% change) Right FN -2.2 (-5.9% change) Left hip -1.5 (-5.1% change) Right hip -1.6 (-4.7% change) forearm -1.4 (+ 1.8% change)  =======================  Labs 11/30/22 Gluc 105, A1c 5.7 Ca 10.1, alb 3.9, Mg 2.2, Phos 3.8 GFR >60 vit D 29, iPTH 131 TSH 1.124